# Patient Record
Sex: MALE
[De-identification: names, ages, dates, MRNs, and addresses within clinical notes are randomized per-mention and may not be internally consistent; named-entity substitution may affect disease eponyms.]

---

## 2020-11-09 ENCOUNTER — NURSE TRIAGE (OUTPATIENT)
Dept: OTHER | Facility: CLINIC | Age: 25
End: 2020-11-09

## 2020-11-09 NOTE — TELEPHONE ENCOUNTER
Answer Assessment - Initial Assessment Questions  1. REASON FOR CALL or QUESTION: \"What is your reason for calling today? \" or \"How can I best help you? \" or \"What question do you have that I can help answer? \"      Pt needs a neurologist within network. Protocols used: INFORMATION ONLY CALL - NO TRIAGE-ADULT-[place]    Caller provided care advice and instructed to call back with worsening symptoms. Pt given these MDs per request     220 [facility] 90, [phone] [address] [id number] [address] [facility] 83 [phone] [facility] [phone] [facility] [phone]    Attention Provider: Thank you for allowing me to participate in the care of your patient. The patient was connected to triage in response to information provided to the [facility]. Please do not respond through this encounter as the response is not directed to a shared pool.